# Patient Record
Sex: MALE | Race: WHITE | NOT HISPANIC OR LATINO | URBAN - METROPOLITAN AREA
[De-identification: names, ages, dates, MRNs, and addresses within clinical notes are randomized per-mention and may not be internally consistent; named-entity substitution may affect disease eponyms.]

---

## 2023-06-01 ENCOUNTER — APPOINTMENT (OUTPATIENT)
Dept: URBAN - METROPOLITAN AREA CLINIC 184 | Age: 53
Setting detail: DERMATOLOGY
End: 2023-06-01

## 2023-06-01 VITALS — WEIGHT: 216 LBS | HEIGHT: 70 IN

## 2023-06-01 DIAGNOSIS — L57.8 OTHER SKIN CHANGES DUE TO CHRONIC EXPOSURE TO NONIONIZING RADIATION: ICD-10-CM

## 2023-06-01 DIAGNOSIS — L01.01 NON-BULLOUS IMPETIGO: ICD-10-CM

## 2023-06-01 DIAGNOSIS — R21 RASH AND OTHER NONSPECIFIC SKIN ERUPTION: ICD-10-CM

## 2023-06-01 PROCEDURE — OTHER BIOPSY BY SHAVE METHOD: OTHER

## 2023-06-01 PROCEDURE — 11102 TANGNTL BX SKIN SINGLE LES: CPT

## 2023-06-01 PROCEDURE — 99204 OFFICE O/P NEW MOD 45 MIN: CPT | Mod: 25

## 2023-06-01 PROCEDURE — OTHER COUNSELING: OTHER

## 2023-06-01 PROCEDURE — OTHER PRESCRIPTION: OTHER

## 2023-06-01 RX ORDER — MUPIROCIN 20 MG/G
OINTMENT TOPICAL
Qty: 22 | Refills: 0 | Status: ERX | COMMUNITY
Start: 2023-06-01

## 2023-06-01 ASSESSMENT — LOCATION DETAILED DESCRIPTION DERM
LOCATION DETAILED: LEFT CLAVICULAR NECK
LOCATION DETAILED: LEFT PROXIMAL PRETIBIAL REGION
LOCATION DETAILED: RIGHT ANTERIOR MEDIAL PROXIMAL UPPER ARM
LOCATION DETAILED: LEFT AXILLARY VAULT

## 2023-06-01 ASSESSMENT — LOCATION SIMPLE DESCRIPTION DERM
LOCATION SIMPLE: LEFT AXILLARY VAULT
LOCATION SIMPLE: RIGHT UPPER ARM
LOCATION SIMPLE: LEFT ANTERIOR NECK
LOCATION SIMPLE: LEFT PRETIBIAL REGION

## 2023-06-01 ASSESSMENT — LOCATION ZONE DERM
LOCATION ZONE: NECK
LOCATION ZONE: LEG
LOCATION ZONE: ARM
LOCATION ZONE: AXILLAE

## 2023-06-01 NOTE — PROCEDURE: BIOPSY BY SHAVE METHOD
I have personally performed patient's history, physical exam, clinical impressions and treatment plan and the services scribed by Anil Ponce NP   Lab: [id number] Lab: [id number]

## 2023-06-01 NOTE — HPI: RASH
What Type Of Note Output Would You Prefer (Optional)?: Bullet Format
Is This A New Presentation, Or A Follow-Up?: Rash
Additional History: Pt states was seen at urgent care and diagnosed with pityriasis rosea \\nWas given 5 days of oral steroids and topical steroid with no improvement.  Betamethasone \\nFirst area to come up is large area on left lower leg\\n\\nHas tried OTC hydrocortisone, Benadryl and old RX of acyclovir topical \\n\\nPt had bad flare of diverticulitis prior to rash starting.

## 2023-06-01 NOTE — PROCEDURE: BIOPSY BY SHAVE METHOD
Path Notes (To The Dermatopathologist): Rash present x 2m \\nItchy,scaly, pink rash non responsive to steroids